# Patient Record
Sex: MALE | Race: BLACK OR AFRICAN AMERICAN | Employment: FULL TIME | URBAN - METROPOLITAN AREA
[De-identification: names, ages, dates, MRNs, and addresses within clinical notes are randomized per-mention and may not be internally consistent; named-entity substitution may affect disease eponyms.]

---

## 2018-07-16 ENCOUNTER — HOSPITAL ENCOUNTER (OUTPATIENT)
Dept: PHYSICAL THERAPY | Age: 58
Discharge: HOME | End: 2018-07-16
Payer: COMMERCIAL

## 2018-07-16 PROCEDURE — 97110 THERAPEUTIC EXERCISES: CPT

## 2018-07-16 PROCEDURE — 97162 PT EVAL MOD COMPLEX 30 MIN: CPT

## 2018-07-16 NOTE — PROGRESS NOTES
[address] PHYSICAL THERAPY    [address] [place] [address], [address] [place] [address]       Phone: [phone]  Fax: [phone] / 4415 [facility]  Patient Name: Kimmie Jalloh : 1960   Medical   Diagnosis: R  Anterior Knee pain Treatment Diagnosis: Right knee pain [M25.561]   Onset Date: 2018     Referral Source: [facility]): 2018   Prior Hospitalization: See medical history Provider #: [id number]   Prior Level of Function: No difficulty with walking or ascending/descending stairs   Comorbidities: Arthritis, HTN   Medications: Verified on Patient Summary List   The Plan of Care and following information is based on the information from the initial evaluation.   ===========================================================================================  Assessment / key information:  Patient is a 59year old male presenting to therapy with signs and symptoms consistent with R Anterior knee pain. Patient states his symptoms began about 2 weeks ago after walking a lot at work as well as going up and down stairs. Patient reports later on that day he could barley walk and decided to go to [facility]. Patient received x-rays and was told he had arthritis in his knee and was provided medication. Patient states he is doing better now, but still feels pain with walking and in particular going down stairs. Patient notes symptoms feel better with rest. Patient rates pain at worst 10/10 and 8/10 at best.   Objective Data: Inspection-Patient ambulates with decreased stance time on the R, trunk listing to the R during R stance phase. Increased tibial varus noted bilaterally. Knee AROM: R 0-120 (P!), L 1-0-131. Strength Testing:ext R 4-/5, L 4-/5; abd R 4/5, L 4/5; Knee Flex R 4/5, L 5/5; ext R 4/5, L 5/5; Ankle DF R 5/5, L 5.5.  Flexibility Testing: moderate restriction to B quadriceps. (-) McMurrays on the R. (+) Tenderness to R patellar tendon and patellar fat pad. FOTO: 38/100. Patient educated on diagnosis, prognosis, POC and HEP. Patient issued copy of HEP and denied additional questions. Patient will benefit from skilled PT in order to address these impairments and functional limitations.   ===========================================================================================  Eval Complexity: History MEDIUM  Complexity : 1-2 comorbidities / personal factors will impact the outcome/ POC ;  Examination  HIGH Complexity : 4+ Standardized tests and measures addressing body structure, function, activity limitation and / or participation in recreation ; Presentation MEDIUM Complexity : Evolving with changing characteristics ; Decision Making MEDIUM Complexity : FOTO score of 26-74; Overall Complexity MEDIUM  Problem List: pain affecting function, decrease ROM, decrease strength, impaired gait/ balance, decrease ADL/ functional abilitiies, decrease activity tolerance and decrease flexibility/ joint mobility   Treatment Plan may include any combination of the following: Therapeutic exercise, Therapeutic activities, Neuromuscular re-education, Physical agent/modality, Gait/balance training, Manual therapy, Patient education and Functional mobility training  Patient / Family readiness to learn indicated by: asking questions, trying to perform skills and interest  Persons(s) to be included in education: patient (P)  Barriers to Learning/Limitations: no  Measures taken:    Patient Goal (s): Reduce pain   Patient self reported health status: good  Rehabilitation Potential: good   Short Term Goals: To be accomplished in  3  weeks:  1. Patient will demonstrate independence with HEP for self management of symptoms. 2. Patient will improve R knee flexion AROM to 130 degrees in order to improve tolerance to work activities.  Long Term Goals: To be accomplished in  6  weeks:  1. Patient will improve FOTO to >/= 62/100 in order to improve quality of life. 2. Patient will demonstrate ability to perform 2 sets of 10 repetitions of lateral tap downs on 6 inch block without pain in order to improve tolerance to stair negotiation. 3. Patient will report reduction in pain at worst to 1-2/10 in order to improve tolerance to walking activities. Frequency / Duration:   Patient to be seen  2  times per week for 6  weeks:  Patient / Caregiver education and instruction: self care, activity modification and exercises  G-Codes (GP): latoya  Therapist Signature: Vonda Montejo PT Date: 6/93/4531   Certification Period: na Time: 3:14 PM   ===========================================================================================  I certify that the above Physical Therapy Services are being furnished while the patient is under my care. I agree with the treatment plan and certify that this therapy is necessary. Physician Signature:        Date:       Time:     Please sign and return to [facility] or you may fax the signed copy to [phone]. Thank you.

## 2018-07-16 NOTE — PROGRESS NOTES
PHYSICAL THERAPY - DAILY TREATMENT NOTE    Patient Name: Gurpreet Chandler        Date: 2018  : 1960   YES Patient  Verified  Visit #:     Insurance: Payor: Wyatt Hinojosa / Plan: [facility] JES NULL OTHER / Product Type: PPO /      In time: 245 Out time: 315   Total Treatment Time: 30     Medicare Time Tracking (below)   Total Timed Codes (min):  na 1:1 Treatment Time:  na     TREATMENT AREA =  Right knee pain [M25.561]    SUBJECTIVE  Pain Level (on 0 to 10 scale):  8  / 10   Medication Changes/New allergies or changes in medical history, any new surgeries or procedures? NO    If yes, update Summary List   Subjective Functional Status/Changes:  []  No changes reported     See POC          OBJECTIVE      10 min Therapeutic Exercise:  [x]  See flow sheet   Rationale:      increase strength to improve the patients ability to perform work activities. min Patient Education:  YES  Reviewed HEP   []  Progressed/Changed HEP based on: Other Objective/Functional Measures:    See POC     Post Treatment Pain Level (on 0 to 10) scale:   8  / 10     ASSESSMENT  Assessment/Changes in Function:     See POC     []  See Progress Note/Recertification   Patient will continue to benefit from skilled PT services to modify and progress therapeutic interventions, address functional mobility deficits, address ROM deficits, address strength deficits, analyze and address soft tissue restrictions, analyze and cue movement patterns, analyze and modify body mechanics/ergonomics and assess and modify postural abnormalities to attain remaining goals.    Progress toward goals / Updated goals:    See POC     PLAN  [x]  Upgrade activities as tolerated YES Continue plan of care   []  Discharge due to :    []  Other:      Therapist: Marleni Carpenter, PT    Date: 2018 Time: 3:22 PM     Future Appointments  Date Time Provider Chanell Carvajal   2018 12:30 PM URIEL Clark [facility]   2018 3:30 PM Connee Najjar Tony Campbell, PT [facility] [address]   7/26/2018 3:00 PM [place] Saint, PT [facility] [address]   7/31/2018 3:00 PM 1316 [facility] [address]   8/2/2018 3:00 PM [place] Saint, PT [facility] [address]   8/7/2018 3:00 PM Igor Saint, PT [facility] [address]   8/9/2018 3:00 PM [place] Saint, PT [facility] [address]   8/14/2018 3:30 PM [place] Saint, PT [facility] [address]   8/16/2018 3:00 PM Igorie Saint, PT [facility] [address]   8/21/2018 3:00 PM Igorie Saint, PT [place] [id number]   8/23/2018 3:00 PM Davie Saint, [address]

## 2018-07-18 ENCOUNTER — HOSPITAL ENCOUNTER (OUTPATIENT)
Dept: PHYSICAL THERAPY | Age: 58
Discharge: HOME | End: 2018-07-18
Payer: COMMERCIAL

## 2018-07-18 PROCEDURE — 97140 MANUAL THERAPY 1/> REGIONS: CPT

## 2018-07-18 PROCEDURE — 97110 THERAPEUTIC EXERCISES: CPT

## 2018-07-18 NOTE — PROGRESS NOTES
PHYSICAL THERAPY - DAILY TREATMENT NOTE    Patient Name: Bernardo Oliver        Date: 2018  : 1960   YES Patient  Verified  Visit #:   2   of   12  Insurance: Payor: Ceferino Vincent / Plan: [facility] JES NULL OTHER / Product Type: PPO /      In time: 1230 Out time: 125   Total Treatment Time: 55     Medicare Time Tracking (below)   Total Timed Codes (min):  45 1:1 Treatment Time:       TREATMENT AREA =  Right knee pain [M25.561]    SUBJECTIVE  Pain Level (on 0 to 10 scale):  6  / 10   Medication Changes/New allergies or changes in medical history, any new surgeries or procedures? NO    If yes, update Summary List   Subjective Functional Status/Changes:  []  No changes reported     Pt reporting some pain across the front of the knee, just below the patella. OBJECTIVE  Modalities Rationale:     decrease inflammation and decrease pain to improve patient's ability to perform pain free ADLs. min [] Estim, type/location:                                      []  att     []  unatt     []  w/US     []  w/ice    []  w/heat    min []  Mechanical Traction: type/lbs                   []  pro   []  sup   []  int   []  cont    []  before manual    []  after manual    min []  Ultrasound, settings/location:      min []  Iontophoresis w/ dexamethasone, location:                                               []  take home patch       []  in clinic   10 min [x]  Ice     []  Heat    location/position: Supine, (R) knee. min []  Vasopneumatic Device, press/temp:     min []  Other:    [x] Skin assessment post-treatment (if applicable):    [x]  intact    []  redness- no adverse reaction     []redness  adverse reaction:        35 min Therapeutic Exercise:  [x]  See flow sheet   Rationale:      increase ROM, increase strength, improve coordination and improve balance to improve the patients ability to perform pain free ADLs. 10 Min Manual Therapy: Lemons taping to unload patellar tendon.     Rationale: decrease pain to improve patient's ability to ambulate. min Patient Education:  YES  Reviewed HEP   []  Progressed/Changed HEP based on: Other Objective/Functional Measures: Added multiple exercises to improve LE strength and stability for ADLs. See flow sheet. Post Treatment Pain Level (on 0 to 10) scale:   4  / 10     ASSESSMENT  Assessment/Changes in Function:     Good tolerance to iniitial treatment. Reports significant pain relief with patellar taping. []  See Progress Note/Recertification   Patient will continue to benefit from skilled PT services to modify and progress therapeutic interventions, address functional mobility deficits, address ROM deficits, address strength deficits, analyze and address soft tissue restrictions, analyze and cue movement patterns, analyze and modify body mechanics/ergonomics and assess and modify postural abnormalities to attain remaining goals. Progress toward goals / Updated goals:    Initiated therex.       PLAN  [x]  Upgrade activities as tolerated YES Continue plan of care   []  Discharge due to :    []  Other:      Therapist: Contreras Adams PTA    Date: 7/18/2018 Time: 7:02 PM     Future Appointments  Date Time Provider Chanell Carvajal   7/24/2018 3:30 PM Ted Padilla, PT [facility]   7/26/2018 3:00 PM Ted Franklinman, PT [facility]   7/31/2018 3:00  [facility]   8/2/2018 3:00 PM Ted , PT [facility]   8/7/2018 3:00 PM Ted Franklinman, PT [facility]   8/9/2018 3:00 PM Ted , PT [facility]   8/14/2018 3:30 PM Ted , PT [facility]   8/16/2018 3:00 PM Ted Padilla, PT [facility]   8/21/2018 3:00 PM Ted , PT [facility]   8/23/2018 3:00 PM Ted Franklinman, PT [facility]

## 2018-07-24 ENCOUNTER — APPOINTMENT (OUTPATIENT)
Dept: PHYSICAL THERAPY | Age: 58
End: 2018-07-24
Payer: COMMERCIAL

## 2018-07-26 ENCOUNTER — APPOINTMENT (OUTPATIENT)
Dept: PHYSICAL THERAPY | Age: 58
End: 2018-07-26
Payer: COMMERCIAL

## 2018-07-31 ENCOUNTER — APPOINTMENT (OUTPATIENT)
Dept: PHYSICAL THERAPY | Age: 58
End: 2018-07-31
Payer: COMMERCIAL

## 2018-08-02 ENCOUNTER — APPOINTMENT (OUTPATIENT)
Dept: PHYSICAL THERAPY | Age: 58
End: 2018-08-02

## 2018-08-07 ENCOUNTER — APPOINTMENT (OUTPATIENT)
Dept: PHYSICAL THERAPY | Age: 58
End: 2018-08-07

## 2018-08-09 ENCOUNTER — APPOINTMENT (OUTPATIENT)
Dept: PHYSICAL THERAPY | Age: 58
End: 2018-08-09

## 2018-08-14 ENCOUNTER — APPOINTMENT (OUTPATIENT)
Dept: PHYSICAL THERAPY | Age: 58
End: 2018-08-14

## 2018-08-16 ENCOUNTER — APPOINTMENT (OUTPATIENT)
Dept: PHYSICAL THERAPY | Age: 58
End: 2018-08-16

## 2018-08-21 ENCOUNTER — APPOINTMENT (OUTPATIENT)
Dept: PHYSICAL THERAPY | Age: 58
End: 2018-08-21

## 2018-08-23 ENCOUNTER — APPOINTMENT (OUTPATIENT)
Dept: PHYSICAL THERAPY | Age: 58
End: 2018-08-23